# Patient Record
Sex: FEMALE | NOT HISPANIC OR LATINO | Employment: UNEMPLOYED | URBAN - METROPOLITAN AREA
[De-identification: names, ages, dates, MRNs, and addresses within clinical notes are randomized per-mention and may not be internally consistent; named-entity substitution may affect disease eponyms.]

---

## 2021-10-14 ENCOUNTER — OFFICE VISIT (OUTPATIENT)
Dept: OBSTETRICS AND GYNECOLOGY | Facility: CLINIC | Age: 36
End: 2021-10-14

## 2021-10-14 VITALS
HEIGHT: 62 IN | BODY MASS INDEX: 35.7 KG/M2 | SYSTOLIC BLOOD PRESSURE: 130 MMHG | WEIGHT: 194 LBS | DIASTOLIC BLOOD PRESSURE: 72 MMHG

## 2021-10-14 DIAGNOSIS — N83.9 DISORDER OF OVULATION: ICD-10-CM

## 2021-10-14 DIAGNOSIS — N92.6 MISSED MENSES: Primary | ICD-10-CM

## 2021-10-14 LAB
B-HCG UR QL: NEGATIVE
EXPIRATION DATE: NORMAL
INTERNAL NEGATIVE CONTROL: NORMAL
INTERNAL POSITIVE CONTROL: NORMAL
Lab: NORMAL
T4 FREE SERPL-MCNC: 1.04 NG/DL (ref 0.93–1.7)
TSH SERPL DL<=0.005 MIU/L-ACNC: 7.93 UIU/ML (ref 0.27–4.2)

## 2021-10-14 PROCEDURE — 99204 OFFICE O/P NEW MOD 45 MIN: CPT | Performed by: OBSTETRICS & GYNECOLOGY

## 2021-10-14 PROCEDURE — 81025 URINE PREGNANCY TEST: CPT | Performed by: OBSTETRICS & GYNECOLOGY

## 2021-10-14 RX ORDER — MEDROXYPROGESTERONE ACETATE 10 MG/1
10 TABLET ORAL DAILY
Qty: 10 TABLET | Refills: 2 | Status: SHIPPED | OUTPATIENT
Start: 2021-10-14 | End: 2021-10-24

## 2021-10-14 NOTE — PROGRESS NOTES
CC: Missed menses.  Patient is seen with her  today and they are from [place] originally. They have just moved here from [place] due to his job. She gives a history of having had normal menses every 26 days lasting for 3 days. She did have a 10-day delay in her menses in April of 2021 and then, most recently, she started her period on August 13th and is still not had a menses. She took a home pregnancy test that been negative and we did a pregnancy test here that was negative. They relate that they did see a provider in [place] due to the length of time that they've been trying to get pregnant. They describe some normal blood work and what sounds like a monitored cycle where an ultrasound showed good follicle development but she does not remember any other test and was never given any medication to help her ovulate. I discussed the delay in ovulation that is likely the diagnosis here. They do agree to a minimal work-up to get started here.  Assessment: 1.  Disorder of ovulation.  2.  Missed menses.  3.  Desired pregnancy  plan: TSH, prolactin, LH and FSH. Semen analysis information given to . Will reassess after all of these values. We'll send in a Provera to initiate a withdrawal bleed.  I spent 30 minutes caring for Coco on this date of service. This time includes time spent by me in the following activities: obtaining and/or reviewing a separately obtained history, counseling and educating the patient/family/caregiver, ordering medications, tests, or procedures and documenting information in the medical record

## 2021-10-15 LAB
FSH SERPL-ACNC: 7.2 MIU/ML
LH SERPL-ACNC: 7.2 MIU/ML
PROLACTIN SERPL-MCNC: 18.5 NG/ML (ref 4.8–23.3)

## 2021-10-26 ENCOUNTER — OFFICE VISIT (OUTPATIENT)
Dept: FAMILY MEDICINE CLINIC | Facility: CLINIC | Age: 36
End: 2021-10-26

## 2021-10-26 VITALS
SYSTOLIC BLOOD PRESSURE: 133 MMHG | OXYGEN SATURATION: 98 % | HEIGHT: 62 IN | WEIGHT: 196 LBS | BODY MASS INDEX: 36.07 KG/M2 | HEART RATE: 72 BPM | TEMPERATURE: 97.8 F | DIASTOLIC BLOOD PRESSURE: 84 MMHG

## 2021-10-26 DIAGNOSIS — L84 CORN OF FOOT: ICD-10-CM

## 2021-10-26 DIAGNOSIS — N91.2 AMENORRHEA: ICD-10-CM

## 2021-10-26 DIAGNOSIS — E03.4 HYPOTHYROIDISM DUE TO ACQUIRED ATROPHY OF THYROID: Primary | ICD-10-CM

## 2021-10-26 PROCEDURE — 99203 OFFICE O/P NEW LOW 30 MIN: CPT | Performed by: FAMILY MEDICINE

## 2021-10-26 RX ORDER — LEVOTHYROXINE SODIUM 0.03 MG/1
25 TABLET ORAL DAILY
Qty: 90 TABLET | Refills: 0 | Status: SHIPPED | OUTPATIENT
Start: 2021-10-26 | End: 2022-02-01 | Stop reason: SDUPTHER

## 2021-10-26 RX ORDER — MEDROXYPROGESTERONE ACETATE 10 MG/1
10 TABLET ORAL DAILY
COMMUNITY
End: 2022-09-20 | Stop reason: SDUPTHER

## 2021-11-08 ENCOUNTER — TELEPHONE (OUTPATIENT)
Dept: OBSTETRICS AND GYNECOLOGY | Facility: CLINIC | Age: 36
End: 2021-11-08

## 2021-11-08 NOTE — TELEPHONE ENCOUNTER
Called pt about n/s on 11/4/21, pt states she no longer needed the appointment because she started her cycle.darline

## 2021-12-13 ENCOUNTER — OFFICE VISIT (OUTPATIENT)
Dept: FAMILY MEDICINE CLINIC | Facility: CLINIC | Age: 36
End: 2021-12-13

## 2021-12-13 VITALS
TEMPERATURE: 97.5 F | WEIGHT: 194.5 LBS | SYSTOLIC BLOOD PRESSURE: 116 MMHG | OXYGEN SATURATION: 98 % | DIASTOLIC BLOOD PRESSURE: 80 MMHG | BODY MASS INDEX: 35.79 KG/M2 | HEIGHT: 62 IN | HEART RATE: 103 BPM

## 2021-12-13 DIAGNOSIS — E03.4 HYPOTHYROIDISM DUE TO ACQUIRED ATROPHY OF THYROID: ICD-10-CM

## 2021-12-13 DIAGNOSIS — Z00.00 ROUTINE PHYSICAL EXAMINATION: Primary | ICD-10-CM

## 2021-12-13 PROCEDURE — 99395 PREV VISIT EST AGE 18-39: CPT | Performed by: FAMILY MEDICINE

## 2021-12-13 NOTE — PROGRESS NOTES
"Chief Complaint  Hypothyroidism (F/u for TSH) and Annual Exam    Subjective          Coco Sánchez presents to [facility]  History of Present Illness for follow-up on annual exam and hypothyroidism.  pt with h/o recent diagnosis of  hypothyrodism complaining of weight gain, decreased energy.  She is taking 25 mcg of levothyroxine.  Patient diagnosed with hypothyroidism 2 months ago.  Denies any diarrhea but giving history of palpitation and anxiety.    Objective   Vital Signs:   /80   Pulse 103 Comment: Nervous  Temp 97.5 °F (36.4 °C) (Infrared)   Ht 157.5 cm (62\")   Wt 88.2 kg (194 lb 8 oz)   SpO2 98%   BMI 35.57 kg/m²     Physical Exam   Result Review :                 Assessment and Plan    Diagnoses and all orders for this visit:    1. Routine physical examination (Primary)  -     TSH+Free T4  -     Lipid Panel  -     Comprehensive Metabolic Panel  -     CBC & Differential  -     Hepatitis C Antibody    2. Hypothyroidism due to acquired atrophy of thyroid    Coco Sánchez, is a 36-year-old female patient seen today for  Routine physical, preventive screening discussed with patient.  She will schedule appointment with OB for Pap smear and pelvic exam.  Information on female on the OB group given to patient.  Daily activity also recommended patient.  I advised her intermittent fasting or low calorie diet.  She is not sure about her last Tdap we will check her record from [place].  We will do labs today.    Hypothyroidism, we will check TSH and T4 again today and if this is still low I will increase to 50 MCG.  ,    Follow Up   No follow-ups on file.  Patient was given instructions and counseling regarding her condition or for health maintenance advice. Please see specific information pulled into the AVS if appropriate.       "

## 2021-12-16 LAB
ALBUMIN SERPL-MCNC: 4.5 G/DL (ref 3.8–4.8)
ALBUMIN/GLOB SERPL: 1.8 {RATIO} (ref 1.2–2.2)
ALP SERPL-CCNC: 69 IU/L (ref 44–121)
ALT SERPL-CCNC: 18 IU/L (ref 0–32)
AST SERPL-CCNC: 19 IU/L (ref 0–40)
BASOPHILS # BLD AUTO: 0 X10E3/UL (ref 0–0.2)
BASOPHILS NFR BLD AUTO: 0 %
BILIRUB SERPL-MCNC: 0.2 MG/DL (ref 0–1.2)
BUN SERPL-MCNC: 9 MG/DL (ref 6–20)
BUN/CREAT SERPL: 13 (ref 9–23)
CALCIUM SERPL-MCNC: 10 MG/DL (ref 8.7–10.2)
CHLORIDE SERPL-SCNC: 101 MMOL/L (ref 96–106)
CHOLEST SERPL-MCNC: 191 MG/DL (ref 100–199)
CO2 SERPL-SCNC: 22 MMOL/L (ref 20–29)
CREAT SERPL-MCNC: 0.67 MG/DL (ref 0.57–1)
EOSINOPHIL # BLD AUTO: 0.2 X10E3/UL (ref 0–0.4)
EOSINOPHIL NFR BLD AUTO: 2 %
ERYTHROCYTE [DISTWIDTH] IN BLOOD BY AUTOMATED COUNT: 13.5 % (ref 11.7–15.4)
GLOBULIN SER CALC-MCNC: 2.5 G/DL (ref 1.5–4.5)
GLUCOSE SERPL-MCNC: 84 MG/DL (ref 65–99)
HCT VFR BLD AUTO: 41.3 % (ref 34–46.6)
HCV AB S/CO SERPL IA: <0.1 S/CO RATIO (ref 0–0.9)
HDLC SERPL-MCNC: 48 MG/DL
HGB BLD-MCNC: 13.2 G/DL (ref 11.1–15.9)
IMM GRANULOCYTES # BLD AUTO: 0 X10E3/UL (ref 0–0.1)
IMM GRANULOCYTES NFR BLD AUTO: 0 %
LDLC SERPL CALC-MCNC: 125 MG/DL (ref 0–99)
LYMPHOCYTES # BLD AUTO: 2.3 X10E3/UL (ref 0.7–3.1)
LYMPHOCYTES NFR BLD AUTO: 31 %
MCH RBC QN AUTO: 25 PG (ref 26.6–33)
MCHC RBC AUTO-ENTMCNC: 32 G/DL (ref 31.5–35.7)
MCV RBC AUTO: 78 FL (ref 79–97)
MONOCYTES # BLD AUTO: 0.4 X10E3/UL (ref 0.1–0.9)
MONOCYTES NFR BLD AUTO: 5 %
NEUTROPHILS # BLD AUTO: 4.6 X10E3/UL (ref 1.4–7)
NEUTROPHILS NFR BLD AUTO: 62 %
PLATELET # BLD AUTO: 390 X10E3/UL (ref 150–450)
POTASSIUM SERPL-SCNC: 4.4 MMOL/L (ref 3.5–5.2)
PROT SERPL-MCNC: 7 G/DL (ref 6–8.5)
RBC # BLD AUTO: 5.29 X10E6/UL (ref 3.77–5.28)
SODIUM SERPL-SCNC: 139 MMOL/L (ref 134–144)
T4 FREE SERPL-MCNC: 1.23 NG/DL (ref 0.82–1.77)
TRIGL SERPL-MCNC: 99 MG/DL (ref 0–149)
TSH SERPL DL<=0.005 MIU/L-ACNC: 3.71 UIU/ML (ref 0.45–4.5)
VLDLC SERPL CALC-MCNC: 18 MG/DL (ref 5–40)
WBC # BLD AUTO: 7.6 X10E3/UL (ref 3.4–10.8)

## 2021-12-20 ENCOUNTER — TELEPHONE (OUTPATIENT)
Dept: FAMILY MEDICINE CLINIC | Facility: CLINIC | Age: 36
End: 2021-12-20

## 2021-12-20 NOTE — TELEPHONE ENCOUNTER
Left voicemail for patient regarding these results/recommendations. OK per HIPAA Open telephone encounter left for reread by hub.    Hub please inform patient if call back:    Jamar!    Dr. Sparks has received and reviewed your lab results. She says the following:      Thyroid at goal continue same.  Also start taking prenatal vitamins every day           If you have any questions, feel free to reach back out to us!    Have a Good Day!  PEPE Diallo

## 2022-01-04 ENCOUNTER — TELEPHONE (OUTPATIENT)
Dept: OBSTETRICS AND GYNECOLOGY | Facility: CLINIC | Age: 37
End: 2022-01-04

## 2022-02-01 RX ORDER — LEVOTHYROXINE SODIUM 0.03 MG/1
25 TABLET ORAL DAILY
Qty: 90 TABLET | Refills: 1 | Status: SHIPPED | OUTPATIENT
Start: 2022-02-01 | End: 2022-05-10 | Stop reason: SDUPTHER

## 2022-02-01 NOTE — TELEPHONE ENCOUNTER
Rx Refill Note  Requested Prescriptions     Pending Prescriptions Disp Refills   • levothyroxine (Synthroid) 25 MCG tablet 90 tablet 0     Sig: Take 1 tablet by mouth Daily.      Last office visit with prescribing clinician: 12/13/2021      Next office visit with prescribing clinician: 6/13/2022            Imani Pittman MA  02/01/22, 12:50 EST

## 2022-03-03 ENCOUNTER — OFFICE VISIT (OUTPATIENT)
Dept: OBSTETRICS AND GYNECOLOGY | Facility: CLINIC | Age: 37
End: 2022-03-03

## 2022-03-03 VITALS
DIASTOLIC BLOOD PRESSURE: 72 MMHG | SYSTOLIC BLOOD PRESSURE: 122 MMHG | BODY MASS INDEX: 36.03 KG/M2 | HEIGHT: 62 IN | WEIGHT: 195.8 LBS

## 2022-03-03 DIAGNOSIS — N93.9 ABNORMAL UTERINE BLEEDING (AUB): ICD-10-CM

## 2022-03-03 DIAGNOSIS — Z01.419 WELL WOMAN EXAM WITH ROUTINE GYNECOLOGICAL EXAM: Primary | ICD-10-CM

## 2022-03-03 PROCEDURE — 99395 PREV VISIT EST AGE 18-39: CPT | Performed by: OBSTETRICS & GYNECOLOGY

## 2022-03-03 NOTE — PROGRESS NOTES
Chief Complaint   Patient presents with   • Gynecologic Exam     Patient here to discuss contraception and periods getting closer together        Coco Sánchez is a 36 y.o.  who presents for an annual examination   Here with her , prefers to use him as an interpretor  She has an 9yo. Tried for 2 years to conceive after that and was not successful.   She was seen in [place] and had an ultrasound which she reports was normal.    She was seen in the Fall and had an elevated TSH.  She has been on Synthroid and last values in Dec were normal.  They tried for four months with their 7 yo.  Did not use any ART  Her  has not yet had a semen analysis.    Prior to treatment of the hypothyroidism she was having some spacing of her menstrual cycles which have subsequently improved.     Pap history:  Last pap: Unsure  Prior abnormal paps: no  STDs  Sexually active: yes  History of STDs: no  Contraception:  none    Screening for BRCA-   Is patient's family history significant for BRCA risk factors? no    Past Medical History:   Diagnosis Date   • Hypothyroid      Past Surgical History:   Procedure Laterality Date   •  SECTION       OB History    Para Term  AB Living   1 1 1     1   SAB IAB Ectopic Molar Multiple Live Births             1      # Outcome Date GA Lbr Yanick/2nd Weight Sex Delivery Anes PTL Lv   1 Term 13   3750 g (8 lb 4.3 oz) M CS-Unspec   YONY      Complications: Failure to Progress in Second Stage      Social History     Tobacco Use   • Smoking status: Never Smoker   • Smokeless tobacco: Never Used   Vaping Use   • Vaping Use: Never used   Substance Use Topics   • Alcohol use: Never   • Drug use: Never     Family History   Problem Relation Age of Onset   • Diabetes Father    • Breast cancer Neg Hx    • Ovarian cancer Neg Hx    • Uterine cancer Neg Hx    • Colon cancer Neg Hx      Current Outpatient Medications on File Prior to Visit   Medication Sig Dispense Refill  "  • levothyroxine (Synthroid) 25 MCG tablet Take 1 tablet by mouth Daily. 90 tablet 1   • Prenatal Vit-Fe Fumarate-FA (PRENATAL VITAMINS PO) Take  by mouth.     • medroxyPROGESTERone (PROVERA) 10 MG tablet Take 10 mg by mouth Daily.     • salicylic acid-lactic acid 17 % external solution Apply  topically to the appropriate area as directed Daily. 14 mL 1     No current facility-administered medications on file prior to visit.     No Known Allergies     Review of Systems  See HPI    OBJECTIVE:   Vitals:    03/03/22 1430   BP: 122/72   Weight: 88.8 kg (195 lb 12.8 oz)   Height: 157.5 cm (62\")      Physical Exam  Exam conducted with a chaperone present.   Constitutional:       General: She is not in acute distress.     Appearance: She is well-developed. She is not diaphoretic.   HENT:      Head: Normocephalic and atraumatic.   Neck:      Thyroid: No thyromegaly.      Trachea: No tracheal deviation.   Cardiovascular:      Rate and Rhythm: Normal rate.      Heart sounds: Normal heart sounds. No murmur heard.  No friction rub. No gallop.    Pulmonary:      Effort: Pulmonary effort is normal. No respiratory distress.      Breath sounds: Normal breath sounds.   Chest:      Chest wall: No tenderness.   Breasts:      Right: No inverted nipple, mass, nipple discharge, skin change or tenderness.      Left: No inverted nipple, mass, nipple discharge, skin change or tenderness.       Abdominal:      General: There is no distension.      Palpations: Abdomen is soft. There is no mass.      Tenderness: There is no abdominal tenderness.   Genitourinary:     General: Normal vulva.      Labia:         Right: No rash, lesion or injury.         Left: No rash, lesion or injury.       Vagina: No vaginal discharge, tenderness or bleeding.      Cervix: No cervical motion tenderness, discharge or friability.      Uterus: Not deviated, not enlarged, not fixed and not tender.       Adnexa:         Right: No mass, tenderness or fullness.         "  Left: No mass, tenderness or fullness.     Musculoskeletal:         General: No deformity. Normal range of motion.   Lymphadenopathy:      Cervical: No cervical adenopathy.   Skin:     General: Skin is warm and dry.      Findings: No rash.   Neurological:      Mental Status: She is alert and oriented to person, place, and time.   Psychiatric:         Behavior: Behavior normal.         Thought Content: Thought content normal.         Judgment: Judgment normal.         ASSESSMENT/PLAN:     Annual well woman exam:  Cervical cancer screening:    Denies cervical dysplasia in past   The patient is due for a pap today.    Screening guidelines discussed with patient  Breast cancer screening:    Clinical breast exam recommended for age 20-39 years every 1-3 years   Mammogram recommended starting age 40    Breast self awareness encouraged  STD Screening   Testing agrees to serum labs.    Contraception :   Declined, on PNV   Reviewed limitations of our office in treating and work up of infertility. She would like to continue with labs. Will get AMH, testosterone, A1C as well as Rub and Varicella IgG.    She was provided information for a semen analysis. Once this information is back, we would recommend discussion of possible OI vs. Referral to [facility].  She reports a normal ultrasound in [place].    Family history    does not demonstrate need for genetics referral   Healthy lifestyle counseling:   return for routine annual checkups    BMI Counseling  Body mass index is 35.81 kg/m².       Return in about 1 month (around 4/3/2022) for GYN US, GYN visit.

## 2022-03-03 NOTE — PATIENT INSTRUCTIONS
[facility]   [place], [place]   [phone]    [facility]  [place], [place]   [phone]

## 2022-03-11 LAB
CYTOLOGIST CVX/VAG CYTO: NORMAL
CYTOLOGY CVX/VAG DOC CYTO: NORMAL
CYTOLOGY CVX/VAG DOC THIN PREP: NORMAL
DX ICD CODE: NORMAL
HIV 1 & 2 AB SER-IMP: NORMAL
HPV I/H RISK 4 DNA CVX QL PROBE+SIG AMP: NEGATIVE
Lab: NORMAL
OTHER STN SPEC: NORMAL
STAT OF ADQ CVX/VAG CYTO-IMP: NORMAL

## 2022-03-21 ENCOUNTER — TELEPHONE (OUTPATIENT)
Dept: OBSTETRICS AND GYNECOLOGY | Facility: CLINIC | Age: 37
End: 2022-03-21

## 2022-05-02 ENCOUNTER — TELEPHONE (OUTPATIENT)
Dept: OBSTETRICS AND GYNECOLOGY | Facility: CLINIC | Age: 37
End: 2022-05-02

## 2022-05-10 RX ORDER — LEVOTHYROXINE SODIUM 0.03 MG/1
25 TABLET ORAL DAILY
Qty: 90 TABLET | Refills: 0 | Status: SHIPPED | OUTPATIENT
Start: 2022-05-10

## 2022-05-10 NOTE — TELEPHONE ENCOUNTER
Rx Refill Note  Requested Prescriptions     Pending Prescriptions Disp Refills   • levothyroxine (Synthroid) 25 MCG tablet 90 tablet 0     Sig: Take 1 tablet by mouth Daily.      Last office visit with prescribing clinician: 12/13/2021      Next office visit with prescribing clinician: 8/1/2022            Imani Pittman MA  05/10/22, 10:20 EDT

## 2022-05-23 DIAGNOSIS — E55.9 VITAMIN D DEFICIENCY: ICD-10-CM

## 2022-05-23 DIAGNOSIS — E03.4 HYPOTHYROIDISM DUE TO ACQUIRED ATROPHY OF THYROID: Primary | ICD-10-CM

## 2022-05-23 DIAGNOSIS — E78.5 HYPERLIPIDEMIA, UNSPECIFIED HYPERLIPIDEMIA TYPE: ICD-10-CM

## 2022-05-23 DIAGNOSIS — R53.83 FATIGUE, UNSPECIFIED TYPE: ICD-10-CM

## 2022-08-24 ENCOUNTER — TELEPHONE (OUTPATIENT)
Dept: FAMILY MEDICINE CLINIC | Facility: CLINIC | Age: 37
End: 2022-08-24

## 2022-08-24 NOTE — TELEPHONE ENCOUNTER
Caller: Coco Sánchez    Relationship to patient: Self    Best call back number: [phone]  Date of exposure: ?    Date of positive COVID19 test: 8/23/22    Date if possible COVID19 exposure: ?    COVID19 symptoms: COUGH AND SORE THROAT    Date of initial quarantine: 8/23/22    Additional information or concerns: PLEASE CALL AND ADVISE PATIENT AS HOW TO TREAT HER SYMPTOMS.    What is the patients preferred pharmacy: [id number] - [place], [place] - [address] - [phone]  - [phone] FX

## 2022-08-25 ENCOUNTER — OFFICE VISIT (OUTPATIENT)
Dept: FAMILY MEDICINE CLINIC | Facility: CLINIC | Age: 37
End: 2022-08-25

## 2022-08-25 VITALS
WEIGHT: 195 LBS | DIASTOLIC BLOOD PRESSURE: 95 MMHG | OXYGEN SATURATION: 99 % | BODY MASS INDEX: 35.88 KG/M2 | TEMPERATURE: 98.3 F | SYSTOLIC BLOOD PRESSURE: 139 MMHG | HEART RATE: 109 BPM | HEIGHT: 62 IN

## 2022-08-25 DIAGNOSIS — U07.1 COVID-19 VIRUS INFECTION: Primary | ICD-10-CM

## 2022-08-25 PROCEDURE — 99213 OFFICE O/P EST LOW 20 MIN: CPT | Performed by: FAMILY MEDICINE

## 2022-08-25 RX ORDER — FLUTICASONE PROPIONATE 50 MCG
2 SPRAY, SUSPENSION (ML) NASAL DAILY
Qty: 16 G | Refills: 0 | Status: SHIPPED | OUTPATIENT
Start: 2022-08-25

## 2022-08-25 RX ORDER — PSEUDOEPHEDRINE HYDROCHLORIDE 60 MG/1
60 TABLET, FILM COATED ORAL 3 TIMES DAILY PRN
Qty: 30 TABLET | Refills: 0 | Status: SHIPPED | OUTPATIENT
Start: 2022-08-25

## 2022-08-25 NOTE — PROGRESS NOTES
"Chief Complaint  COVID-19 (TESTED POSITIVE ON Monday, CURRENTLY TAKING NYQUIL AND DAYQUIL, HAS SLIGHT COUGH, CONGESTION)    Lori Sánchez presents to [facility]  History of Present Illness    The patient presents in the clinic today with COVID-19 infection.    The patient tested positive for COVID-19 on 08/23/2022. 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 She had a slight cough and a sore throat. She was taking DayQuil NyQuil for relief of symptoms. She notes that her cough has improved. She has received 2 doses of the COVID-19 vaccines but has not received the booster.    Objective   Vital Signs:  /95   Pulse 109   Temp 98.3 °F (36.8 °C)   Ht 157.5 cm (62\")   Wt 88.5 kg (195 lb)   SpO2 99%   BMI 35.67 kg/m²   Estimated body mass index is 35.67 kg/m² as calculated from the following:    Height as of this encounter: 157.5 cm (62\").    Weight as of this encounter: 88.5 kg (195 lb).          Physical Exam  Vitals and nursing note reviewed.   Constitutional:       General: She is not in acute distress.     Appearance: She is well-developed.   HENT:      Head: Normocephalic and atraumatic.      Right Ear: External ear normal. Drainage present.      Left Ear: External ear normal. Drainage present.   Eyes:      General:         Right eye: No discharge.         Left eye: No discharge.      Pupils: Pupils are equal, round, and reactive to light.   Cardiovascular:      Rate and Rhythm: Normal rate and regular rhythm.      Heart sounds: Normal heart sounds.   Pulmonary:      Effort: Pulmonary effort is normal.      Breath sounds: Normal breath sounds. No wheezing or rales.   Abdominal:      General: Bowel sounds are normal.      Palpations: Abdomen is soft. There is no mass.      Tenderness: There is no abdominal tenderness.   Musculoskeletal:    "   Cervical back: Normal range of motion and neck supple.   Lymphadenopathy:      Cervical: No cervical adenopathy.   Neurological:      Mental Status: She is alert and oriented to person, place, and time.          Result Review :                    Assessment and Plan   Diagnoses and all orders for this visit:    1. COVID-19 virus infection (Primary)    Other orders  -     pseudoephedrine (SUDAFED) 60 MG tablet; Take 1 tablet by mouth 3 (Three) Times a Day As Needed for Congestion (ear fullness).  Dispense: 30 tablet; Refill: 0  -     fluticasone (Flonase) 50 MCG/ACT nasal spray; 2 sprays into the nostril(s) as directed by provider Daily.  Dispense: 16 g; Refill: 0         Haneefa Bee Gonzalo presents in clinic today for URI secondary to COVID-19 infection. Symptomatic, I discussed the available medication and symptomatic treatment. We discussed Paxil because of her BMI she will qualify. Patient reported that her symptoms are mild and she is getting better. I advised her to continue taking NyQuil, I will also send a prescription for Flonase nasal spray. She was advised to start taking vitamin D and zinc.         Follow Up   No follow-ups on file.    Patient was given instructions and counseling regarding her condition or for health maintenance advice. Please see specific information pulled into the AVS if appropriate.       Transcribed from ambient dictation for Opal Sparks MD by Rupa Mayes.  08/25/22   13:18 EDT    Patient verbalized consent to the visit recording.

## 2022-09-16 ENCOUNTER — OFFICE VISIT (OUTPATIENT)
Dept: FAMILY MEDICINE CLINIC | Facility: CLINIC | Age: 37
End: 2022-09-16

## 2022-09-16 VITALS
SYSTOLIC BLOOD PRESSURE: 116 MMHG | DIASTOLIC BLOOD PRESSURE: 62 MMHG | TEMPERATURE: 97.7 F | WEIGHT: 192.7 LBS | HEIGHT: 63 IN | HEART RATE: 89 BPM | OXYGEN SATURATION: 100 % | BODY MASS INDEX: 34.14 KG/M2

## 2022-09-16 DIAGNOSIS — E03.4 HYPOTHYROIDISM DUE TO ACQUIRED ATROPHY OF THYROID: ICD-10-CM

## 2022-09-16 DIAGNOSIS — Z13.1 SCREENING FOR DIABETES MELLITUS: ICD-10-CM

## 2022-09-16 DIAGNOSIS — M54.9 UPPER BACK PAIN: ICD-10-CM

## 2022-09-16 DIAGNOSIS — N91.2 AMENORRHEA: Primary | ICD-10-CM

## 2022-09-16 DIAGNOSIS — R53.83 FATIGUE, UNSPECIFIED TYPE: ICD-10-CM

## 2022-09-16 LAB
B-HCG UR QL: NEGATIVE
EXPIRATION DATE: NORMAL
INTERNAL NEGATIVE CONTROL: NORMAL
INTERNAL POSITIVE CONTROL: NORMAL
Lab: NORMAL

## 2022-09-16 PROCEDURE — 99214 OFFICE O/P EST MOD 30 MIN: CPT | Performed by: FAMILY MEDICINE

## 2022-09-16 PROCEDURE — 81025 URINE PREGNANCY TEST: CPT | Performed by: FAMILY MEDICINE

## 2022-09-16 RX ORDER — LEVOTHYROXINE SODIUM 0.1 MG/1
100 TABLET ORAL DAILY
COMMUNITY
End: 2022-09-22

## 2022-09-16 NOTE — PROGRESS NOTES
"Chief Complaint  Hypothyroidism (F/u hypothyroidism, Pt c/o of back pain x3wks. Worse with sitting for extended periods of time. Pain rate at 6. Patient also states she missed menstrual period was to be due 8/24.) and Back Pain    Subjective        Coco Sánchez presents to [facility] for follow-up on hypothyroidism and back pain.    History of Present Illness    The patient complains of not having her menstrual cycle for last 2 months.. Her last menstrual cycle was on 07/24/2022. She has not done a pregnancy test and is unsure if she may be pregnant. Denies any nausea or vomiting.  History of irregular menstrual cycle in the past.    The patient is also complaining of an upper back pain for 3 weeks. She has to sit for a prolonged period of time at work.     The patient has done her lab tests in [place] back in 07/2022, but forgot to bring her results today. A CBC, lipid panel and thyroid test were done. She states that her lipid panel was normal but her thyroid tests were not.   pt with h/o hypothyrodism.denies change in energy level, diarrhea, heat / cold intolerance, nervousness, palpitations   She is currently taking levothyroxine, iron and zinc tablets.     Objective   Vital Signs:  /62   Pulse 89   Temp 97.7 °F (36.5 °C)   Ht 160 cm (63\")   Wt 87.4 kg (192 lb 11.2 oz)   SpO2 100%   BMI 34.14 kg/m²   Estimated body mass index is 34.14 kg/m² as calculated from the following:    Height as of this encounter: 160 cm (63\").    Weight as of this encounter: 87.4 kg (192 lb 11.2 oz).          Physical Exam  Constitutional:       Appearance: Normal appearance. She is well-developed.   HENT:      Head: Normocephalic and atraumatic.      Right Ear: Tympanic membrane, ear canal and external ear normal.      Left Ear: Tympanic membrane, ear canal and external ear normal.      Nose: Nose normal.      Mouth/Throat:      Mouth: Mucous membranes are moist.   Eyes:      General: No " scleral icterus.     Extraocular Movements: Extraocular movements intact.      Conjunctiva/sclera: Conjunctivae normal.      Pupils: Pupils are equal, round, and reactive to light.   Neck:      Thyroid: No thyromegaly.   Cardiovascular:      Rate and Rhythm: Normal rate and regular rhythm.      Pulses: Normal pulses.      Heart sounds: Normal heart sounds.   Pulmonary:      Effort: Pulmonary effort is normal. No respiratory distress.      Breath sounds: Normal breath sounds. No wheezing or rales.   Abdominal:      General: Bowel sounds are normal. There is no distension.      Palpations: Abdomen is soft. There is no mass.      Tenderness: There is no abdominal tenderness.      Hernia: No hernia is present.   Musculoskeletal:         General: No swelling or tenderness. Normal range of motion.      Cervical back: Normal range of motion and neck supple. Muscular tenderness present. No pain with movement. Normal range of motion.   Lymphadenopathy:      Cervical: No cervical adenopathy.   Skin:     General: Skin is warm.   Neurological:      General: No focal deficit present.      Mental Status: She is alert and oriented to person, place, and time.      Cranial Nerves: No cranial nerve deficit.      Sensory: No sensory deficit.      Deep Tendon Reflexes: Reflexes normal.   Psychiatric:         Mood and Affect: Mood normal.         Behavior: Behavior normal.         Thought Content: Thought content normal.         Judgment: Judgment normal.        Result Review :                  Assessment and Plan   Diagnoses and all orders for this visit:    1. Amenorrhea (Primary)  -     POC Pregnancy, Urine  -     Testosterone (Free & Total), LC / MS  -     hCG, Serum, Qualitative    2. Hypothyroidism due to acquired atrophy of thyroid  -     TSH+Free T4    3. Upper back pain    4. Screening for diabetes mellitus  -     Hemoglobin A1c    5. Fatigue, unspecified type  -     Vitamin B12 & Folate        1. Amenorrhea.  -Her last  menstrual cycle was in 07/24/2022. She has not had any menstrual cycle since then. She also had a history of amenorrhea in the past. Urine pregnancy test done in the office is negative. I will also check beta-HCG. If it is negative, we will prescribe her medroxyprogesterone for withdrawal bleeding. I advised her to lose some weight and start doing some exercise and daily walk.  2. Hypothyroidism.  -I will check her TSH and T4 levels. She is currently on levothyroxine 100mg.  We will call the patient with lab results and medication changes if needed  3. Upper back pain.  -The patient is also complaining of an upper back pain. She has to sit for a prolonged period of time at work. Back stretching exercises recommended to the patient. She can take ibuprofen as needed for the pain.     Follow-up as needed or in 6 months.            Follow Up   No follow-ups on file.    Patient was given instructions and counseling regarding her condition or for health maintenance advice. Please see specific information pulled into the AVS if appropriate.       .[email or web address]    Transcribed from ambient dictation for Opal Sparks MD by Satnam Campa.  09/16/22   12:53 EDT    Patient verbalized consent to the visit recording.

## 2022-09-20 LAB
B-HCG SERPL QL: NEGATIVE
FOLATE SERPL-MCNC: >20 NG/ML (ref 4.78–24.2)
HBA1C MFR BLD: 5.7 % (ref 4.8–5.6)
T4 FREE SERPL-MCNC: 1.26 NG/DL (ref 0.93–1.7)
TESTOST FREE SERPL-MCNC: 0.8 PG/ML (ref 0–4.2)
TESTOST SERPL-MCNC: 23.3 NG/DL (ref 10–55)
TSH SERPL DL<=0.005 MIU/L-ACNC: 2.34 UIU/ML (ref 0.27–4.2)
VIT B12 SERPL-MCNC: 594 PG/ML (ref 211–946)

## 2022-09-20 RX ORDER — MEDROXYPROGESTERONE ACETATE 10 MG/1
10 TABLET ORAL DAILY
Qty: 7 TABLET | Refills: 0 | Status: SHIPPED | OUTPATIENT
Start: 2022-09-20 | End: 2022-11-11 | Stop reason: SDUPTHER

## 2022-09-20 NOTE — TELEPHONE ENCOUNTER
Caller: Coco Sánchez Reema    Relationship: Self    Best call back number: [phone]    Requested Prescriptions:   Requested Prescriptions     Pending Prescriptions Disp Refills   • medroxyPROGESTERone (PROVERA) 10 MG tablet       Sig: Take 1 tablet by mouth Daily.        Pharmacy where request should be sent: [id number] [address]  AT [facility] [phone] [facility] [phone] FX     Additional details provided by patient: PREGNANCY TEST WAS NEGATIVE AND PATIENT IS REQUESTING REFILL    Does the patient have less than a 3 day supply:  [x] Yes  [] No    Westley Snell Rep   09/20/22 14:50 EDT

## 2022-09-20 NOTE — TELEPHONE ENCOUNTER
Rx Refill Note  Requested Prescriptions     Pending Prescriptions Disp Refills   • medroxyPROGESTERone (PROVERA) 10 MG tablet       Sig: Take 1 tablet by mouth Daily.      Last office visit with prescribing clinician: 9/16/2022      Next office visit with prescribing clinician: Visit date not found            Imani Pittman MA  09/20/22, 16:26 EDT

## 2022-09-22 RX ORDER — LEVOTHYROXINE SODIUM 0.1 MG/1
100 TABLET ORAL DAILY
Qty: 90 TABLET | Refills: 1 | Status: SHIPPED | OUTPATIENT
Start: 2022-09-22 | End: 2022-09-23 | Stop reason: SDUPTHER

## 2022-09-22 NOTE — TELEPHONE ENCOUNTER
Rx Refill Note  Requested Prescriptions     Pending Prescriptions Disp Refills   • levothyroxine (SYNTHROID, LEVOTHROID) 100 MCG tablet 90 tablet 1     Sig: Take 1 tablet by mouth Daily.      Last office visit with prescribing clinician: 9/16/2022      Next office visit with prescribing clinician: Visit date not found            Imani Pittman MA  09/22/22, 15:08 EDT

## 2022-09-23 RX ORDER — LEVOTHYROXINE SODIUM 0.1 MG/1
100 TABLET ORAL DAILY
Qty: 90 TABLET | Refills: 1 | Status: SHIPPED | OUTPATIENT
Start: 2022-09-23

## 2022-11-11 RX ORDER — MEDROXYPROGESTERONE ACETATE 10 MG/1
10 TABLET ORAL DAILY
Qty: 7 TABLET | Refills: 0 | Status: SHIPPED | OUTPATIENT
Start: 2022-11-11

## 2023-01-10 ENCOUNTER — TELEPHONE (OUTPATIENT)
Dept: OBSTETRICS AND GYNECOLOGY | Facility: CLINIC | Age: 38
End: 2023-01-10
Payer: COMMERCIAL

## 2023-09-19 ENCOUNTER — OFFICE VISIT (OUTPATIENT)
Dept: OBSTETRICS AND GYNECOLOGY | Facility: CLINIC | Age: 38
End: 2023-09-19
Payer: COMMERCIAL

## 2023-09-19 VITALS
HEIGHT: 63 IN | BODY MASS INDEX: 34.55 KG/M2 | WEIGHT: 195 LBS | SYSTOLIC BLOOD PRESSURE: 144 MMHG | DIASTOLIC BLOOD PRESSURE: 91 MMHG

## 2023-09-19 DIAGNOSIS — R73.09 ELEVATED HEMOGLOBIN A1C: ICD-10-CM

## 2023-09-19 DIAGNOSIS — N93.9 ABNORMAL UTERINE BLEEDING (AUB): Primary | ICD-10-CM

## 2023-09-19 LAB
B-HCG UR QL: NEGATIVE
EXPIRATION DATE: NORMAL
INTERNAL NEGATIVE CONTROL: NEGATIVE
INTERNAL POSITIVE CONTROL: POSITIVE
Lab: NORMAL

## 2023-09-19 RX ORDER — MEDROXYPROGESTERONE ACETATE 10 MG/1
10 TABLET ORAL DAILY
Qty: 10 TABLET | Refills: 0 | Status: SHIPPED | OUTPATIENT
Start: 2023-09-19 | End: 2023-09-29

## 2023-09-19 NOTE — PROGRESS NOTES
"SUBJECTIVE:   Chief Complaint   Patient presents with    Menstrual Problem     Patient did not have a period in  or July she was in her home town and was unable to see someone. When she got back she started bleeding 5 days later on aug 10th and has been bleeding since. States the bleeding is constantly light, has not had any heavy bleeding episodes.         Coco Sánchez is a 38 y.o.  who presents for abnormal uterine bleeding.  She was in her home country, [place].  She had normal menstrual cycles up until  and July when she skipped her menses.  Back in  she reports taking Provera for induction of menstrual cycles when she missed a cycle.  Starting around August 10 when she returned to the [place], she started bleeding lightly has blood daily.  In the last couple days, this has gotten heavier but around the  this when she has her typical menses.  She denies any pain.  She is trying to conceive.    In [place], they checked her thyroid and lowered her to 50 mcg daily of levothyroxine.  She is also on a B complex vitamin and started on medication for cholesterol which she finished last week.  She reports this is \"Sevatin\"    She was offered and declined an interpretor    Past Medical History:   Diagnosis Date    Hypothyroid      Past Surgical History:   Procedure Laterality Date     SECTION       OB History    Para Term  AB Living   1 1 1     1   SAB IAB Ectopic Molar Multiple Live Births             1      # Outcome Date GA Lbr Yanick/2nd Weight Sex Delivery Anes PTL Lv   1 Term 13   3750 g (8 lb 4.3 oz) M CS-Unspec   YONY      Complications: Failure to Progress in Second Stage      Social History     Tobacco Use    Smoking status: Never    Smokeless tobacco: Never   Vaping Use    Vaping Use: Never used   Substance Use Topics    Alcohol use: Never    Drug use: Never     Family History   Problem Relation Age of Onset    Diabetes Father     Breast cancer Neg Hx     Ovarian " "cancer Neg Hx     Uterine cancer Neg Hx     Colon cancer Neg Hx      Current Outpatient Medications on File Prior to Visit   Medication Sig Dispense Refill    fluticasone (Flonase) 50 MCG/ACT nasal spray 2 sprays into the nostril(s) as directed by provider Daily. 16 g 0    levothyroxine (Synthroid) 25 MCG tablet Take 1 tablet by mouth Daily. 90 tablet 0    Multiple Vitamins-Minerals (b complex-C-E-zinc) tablet Take 1 tablet by mouth Daily.      salicylic acid-lactic acid 17 % external solution Apply  topically to the appropriate area as directed Daily. 14 mL 1    vitamin D3 125 MCG (5000 UT) capsule capsule Take 1 capsule by mouth Daily.      [DISCONTINUED] levothyroxine (SYNTHROID, LEVOTHROID) 100 MCG tablet Take 1 tablet by mouth Daily. 90 tablet 1    [DISCONTINUED] medroxyPROGESTERone (PROVERA) 10 MG tablet Take 1 tablet by mouth Daily. 7 tablet 0    [DISCONTINUED] Prenatal Vit-Fe Fumarate-FA (PRENATAL VITAMINS PO) Take  by mouth.      [DISCONTINUED] pseudoephedrine (SUDAFED) 60 MG tablet Take 1 tablet by mouth 3 (Three) Times a Day As Needed for Congestion (ear fullness). 30 tablet 0     No current facility-administered medications on file prior to visit.     No Known Allergies     Review of Systems      OBJECTIVE:   Vitals:    09/19/23 1248   BP: 144/91   Weight: 88.5 kg (195 lb)   Height: 160 cm (62.99\")      Physical Exam  Exam conducted with a chaperone present.   Constitutional:       General: She is not in acute distress.     Appearance: She is well-developed. She is not diaphoretic.   HENT:      Head: Normocephalic and atraumatic.   Eyes:      General: No scleral icterus.     Extraocular Movements: Extraocular movements intact.   Pulmonary:      Effort: Pulmonary effort is normal. No respiratory distress.   Genitourinary:     General: Normal vulva.      Vagina: No signs of injury and foreign body. Bleeding present. No vaginal discharge.      Cervix: Cervical bleeding present. No friability.      Uterus: " Normal.    Skin:     General: Skin is warm and dry.   Neurological:      General: No focal deficit present.      Mental Status: She is alert and oriented to person, place, and time.   Psychiatric:         Mood and Affect: Mood normal.         Behavior: Behavior normal.         Thought Content: Thought content normal.         Judgment: Judgment normal.       ASSESSMENT/PLAN:     ICD-10-CM ICD-9-CM   1. Abnormal uterine bleeding (AUB)  N93.9 626.9   2. Elevated hemoglobin A1c  R73.09 790.29       Recommended an ultrasound and labs.  She will start Provera as this is helped improve her bleeding in the past.  Reviewed bleeding precautions  Encouraged folic acid supplementation and avoidance of statins if TTC    Orders Placed This Encounter   Procedures    TSH Rfx On Abnormal To Free T4     Order Specific Question:   Release to patient     Answer:   Routine Release [[id number]]    CBC (No Diff)     Order Specific Question:   Release to patient     Answer:   Routine Release [[id number]]    Testosterone - total     Order Specific Question:   Release to patient     Answer:   Routine Release [[id number]]    Prolactin     Order Specific Question:   Release to patient     Answer:   Routine Release [[id number]]    Hemoglobin A1c     Order Specific Question:   Release to patient     Answer:   Routine Release [[id number]]    POC Pregnancy, Urine     Order Specific Question:   Release to patient     Answer:   Routine Release [[id number]]       Return in about 1 month (around 10/19/2023) for GYN US, GYN visit.

## 2023-09-20 ENCOUNTER — TELEPHONE (OUTPATIENT)
Dept: OBSTETRICS AND GYNECOLOGY | Facility: CLINIC | Age: 38
End: 2023-09-20
Payer: COMMERCIAL

## 2023-09-20 DIAGNOSIS — R79.89 ELEVATED PROLACTIN LEVEL: Primary | ICD-10-CM

## 2023-09-20 LAB
ERYTHROCYTE [DISTWIDTH] IN BLOOD BY AUTOMATED COUNT: 16 % (ref 11.7–15.4)
HBA1C MFR BLD: 5.7 % (ref 4.8–5.6)
HCT VFR BLD AUTO: 39.1 % (ref 34–46.6)
HGB BLD-MCNC: 12.2 G/DL (ref 11.1–15.9)
MCH RBC QN AUTO: 23.6 PG (ref 26.6–33)
MCHC RBC AUTO-ENTMCNC: 31.2 G/DL (ref 31.5–35.7)
MCV RBC AUTO: 76 FL (ref 79–97)
PLATELET # BLD AUTO: 431 X10E3/UL (ref 150–450)
PROLACTIN SERPL-MCNC: 29.7 NG/ML (ref 4.8–23.3)
RBC # BLD AUTO: 5.17 X10E6/UL (ref 3.77–5.28)
TESTOST SERPL-MCNC: 18 NG/DL (ref 8–60)
TSH SERPL DL<=0.005 MIU/L-ACNC: 2.06 UIU/ML (ref 0.45–4.5)
WBC # BLD AUTO: 8.7 X10E3/UL (ref 3.4–10.8)

## 2023-09-20 NOTE — TELEPHONE ENCOUNTER
Please let patient know that her thyroid labs were normal.  Her hemoglobin was also normal at 12.2.  Her prolactin was mildly elevated at 29.7.  We recommend repeating this in the morning while fasting as mild elevations are usually not clinically significant. I have placed an order.  Her A1C was elevated at 5.7; I would recommend reviewing this with her primary care provider as she will likely want to make lifestyle changes to help prevent progression into the diabetic range.

## 2023-09-20 NOTE — TELEPHONE ENCOUNTER
Pt aware. She did not want to come in tomorrow for repeat prolactin, pt scheduled for October 9th. If you would like pt to come in sooner for this, I can try and get her scheduled sooner.     Thanks!

## 2023-10-10 ENCOUNTER — TELEPHONE (OUTPATIENT)
Dept: OBSTETRICS AND GYNECOLOGY | Facility: CLINIC | Age: 38
End: 2023-10-10
Payer: COMMERCIAL

## 2023-10-10 NOTE — TELEPHONE ENCOUNTER
Called patient to discuss. No answer. Left general VM for call back.      Please let patient know that her prolactin was still slightly elevated if she returns call. We will discuss what to do about this at her upcoming appointment on 10/24

## 2023-10-24 ENCOUNTER — OFFICE VISIT (OUTPATIENT)
Dept: OBSTETRICS AND GYNECOLOGY | Facility: CLINIC | Age: 38
End: 2023-10-24
Payer: COMMERCIAL

## 2023-10-24 VITALS
WEIGHT: 194 LBS | BODY MASS INDEX: 34.38 KG/M2 | DIASTOLIC BLOOD PRESSURE: 83 MMHG | HEIGHT: 63 IN | SYSTOLIC BLOOD PRESSURE: 135 MMHG | HEART RATE: 84 BPM

## 2023-10-24 DIAGNOSIS — E22.1 HYPERPROLACTINEMIA: Primary | ICD-10-CM

## 2023-10-24 DIAGNOSIS — N83.209 CYST OF OVARY, UNSPECIFIED LATERALITY: ICD-10-CM

## 2023-10-24 DIAGNOSIS — N93.9 ABNORMAL UTERINE BLEEDING (AUB): ICD-10-CM

## 2023-10-24 NOTE — PROGRESS NOTES
SUBJECTIVE:   Chief Complaint   Patient presents with    Follow-up     F/u from us.          Coco Sánchez is a 38 y.o.  who presents for AUB  Patient reports she was bleeding up until about a week ago.  She did take the Provera but it took about a week after that to stop the bleeding.    She denies any headache or vision changes.  She is taking Synthroid 50 mcg daily, calcium, zinc.  Has not on any other medications.  Her goal is to conceive.      Past Medical History:   Diagnosis Date    Hypothyroid      Past Surgical History:   Procedure Laterality Date     SECTION       OB History    Para Term  AB Living   1 1 1     1   SAB IAB Ectopic Molar Multiple Live Births             1      # Outcome Date GA Lbr Yanick/2nd Weight Sex Delivery Anes PTL Lv   1 Term 13   3750 g (8 lb 4.3 oz) M CS-Unspec   YONY      Complications: Failure to Progress in Second Stage      Social History     Tobacco Use    Smoking status: Never    Smokeless tobacco: Never   Vaping Use    Vaping Use: Never used   Substance Use Topics    Alcohol use: Never    Drug use: Never     Family History   Problem Relation Age of Onset    Diabetes Father     Breast cancer Neg Hx     Ovarian cancer Neg Hx     Uterine cancer Neg Hx     Colon cancer Neg Hx      Current Outpatient Medications on File Prior to Visit   Medication Sig Dispense Refill    fluticasone (Flonase) 50 MCG/ACT nasal spray 2 sprays into the nostril(s) as directed by provider Daily. 16 g 0    levothyroxine (Synthroid) 25 MCG tablet Take 1 tablet by mouth Daily. 90 tablet 0    Multiple Vitamins-Minerals (b complex-C-E-zinc) tablet Take 1 tablet by mouth Daily.      salicylic acid-lactic acid 17 % external solution Apply  topically to the appropriate area as directed Daily. 14 mL 1    vitamin D3 125 MCG (5000 UT) capsule capsule Take 1 capsule by mouth Daily.       No current facility-administered medications on file prior to visit.     No Known Allergies  "    Review of Systems      OBJECTIVE:   Vitals:    10/24/23 1121   BP: 135/83   Pulse: 84   Weight: 88 kg (194 lb)   Height: 160 cm (62.99\")      Physical Exam  Constitutional:       General: She is not in acute distress.     Appearance: She is well-developed. She is not diaphoretic.   HENT:      Head: Normocephalic and atraumatic.   Eyes:      General: No scleral icterus.     Extraocular Movements: Extraocular movements intact.   Pulmonary:      Effort: Pulmonary effort is normal. No respiratory distress.   Skin:     General: Skin is warm and dry.   Neurological:      General: No focal deficit present.      Mental Status: She is alert and oriented to person, place, and time.   Psychiatric:         Mood and Affect: Mood normal.         Behavior: Behavior normal.         Thought Content: Thought content normal.         Judgment: Judgment normal.         ASSESSMENT/PLAN:     ICD-10-CM ICD-9-CM   1. Hyperprolactinemia  E22.1 253.1   2. Cyst of ovary, unspecified laterality  N83.209 620.2   3. Abnormal uterine bleeding (AUB)  N93.9 626.9       Reviewed ultrasound with patient and her .  This showed a follicle with a single septation that was approximately 4 cm. She is not having any pain. Recommend follow up in approximately 1 month and will get .    Hyperprolactinemia: Patient was counseled that recommendation is for MRI imaging even at low levels of elevation of prolactin.  She has no drug-induced causes of hyperprolactinemia.  She is asymptomatic.  I reviewed how prolactin can interfere with fertility, and ovulation.  We discussed that medication can help with this side effect.  She will consider her options with regard to the MRI and let us know how she would like to proceed.  I did place an order for an MRI today.  We discussed the risks of macroadenoma's including vision loss, headaches.  She has not of this at the current time.    Fertility: We will add AMH to labs today    Abnormal uterine bleeding: " Patient was counseled on ultrasound findings which shows an approximately 1 cm endometrial thickness.  We discussed limitations of ultrasound in finding polyps.  We discussed options for saline infusion sonogram, endometrial biopsy, hysteroscopy with dilation and curettage.  We discussed limitations of each of these tests.  I discussed that ultrasound alone cannot rule out hyperplasia or malignancy and I would recommend some sort of endometrial sampling.  She will consider if she would prefer an office sampling versus hysteroscopy, D&C in the operating room.        Orders Placed This Encounter   Procedures    MRI brain w wo contrast     Standing Status:   Future     Standing Expiration Date:   10/24/2024     Order Specific Question:   Release to patient     Answer:   Routine Release [[id number]]    US Non-ob Transvaginal     Standing Status:   Future     Standing Expiration Date:   10/24/2024     Order Specific Question:   Reason for Exam:     Answer:   AUB, follow up 4cm ovarian follicle with septation     Order Specific Question:   Release to patient     Answer:   Routine Release [[id number]]         Order Specific Question:   Release to patient     Answer:   Routine Release [[id number]]    Antimullerian Hormone (AMH)     Order Specific Question:   Release to patient     Answer:   Routine Release [[id number]]       Return in about 1 month (around 11/24/2023) for repeat gyn us and visit.

## 2023-10-25 LAB — CANCER AG125 SERPL-ACNC: 15.1 U/ML (ref 0–38.1)

## 2023-10-27 LAB — MIS SERPL-MCNC: 0.74 NG/ML

## 2023-10-30 ENCOUNTER — TELEPHONE (OUTPATIENT)
Dept: OBSTETRICS AND GYNECOLOGY | Facility: CLINIC | Age: 38
End: 2023-10-30
Payer: COMMERCIAL

## 2023-10-30 NOTE — TELEPHONE ENCOUNTER
Called patient to review AMH.  There was no answer; left general VM for call back.      If patient returns call, please let her know that her AMH was < 1.  In these cases we recommend discussion with a reproductive endocrinologist if she wants to have another baby as the AMH can continue to go down and so we recommend starting fertility treatments as soon as possible.   Below are two of the [facility] offices that are in the [place] area, but there are some more that come here and there are some in surrounding area.    [facility]   [place], [place]   [phone]    [facility]  [place], [place]   [phone]

## 2023-11-02 ENCOUNTER — TELEPHONE (OUTPATIENT)
Dept: OBSTETRICS AND GYNECOLOGY | Facility: CLINIC | Age: 38
End: 2023-11-02

## 2023-11-06 NOTE — TELEPHONE ENCOUNTER
Called patient, no answer. Appt on 11/21 if she doesn't not call back before then I will inform her at this visit.

## 2023-12-19 ENCOUNTER — PATIENT MESSAGE (OUTPATIENT)
Dept: FAMILY MEDICINE CLINIC | Facility: CLINIC | Age: 38
End: 2023-12-19
Payer: COMMERCIAL

## 2023-12-20 NOTE — TELEPHONE ENCOUNTER
Rx Refill Note  Requested Prescriptions     Pending Prescriptions Disp Refills    levothyroxine (Synthroid) 25 MCG tablet 90 tablet 0     Sig: Take 1 tablet by mouth Daily.      Last office visit with prescribing clinician: 9/16/2022   Last telemedicine visit with prescribing clinician: Visit date not found   Next office visit with prescribing clinician: Visit date not found                         Would you like a call back once the refill request has been completed: [] Yes [] No    If the office needs to give you a call back, can they leave a voicemail: [] Yes [] No    Beverly Campbell LPN  12/20/23, 09:11 EST

## 2023-12-20 NOTE — TELEPHONE ENCOUNTER
From: Coco Sánchez  To: Opal Sparks  Sent: 12/19/2023 10:35 PM EST  Subject: Thyroid medicine(50mcg)    Hello Doctor,  The tablets of thyroid may be completed this week.I am taking 50 mcg.In the month of October blood tests were done for thyroid(ts4) and my Gynecologist told me to continue the same tablets.Can you send me the prescription ??    Thanks,  Coco.

## 2023-12-22 RX ORDER — LEVOTHYROXINE SODIUM 0.03 MG/1
25 TABLET ORAL DAILY
Qty: 90 TABLET | Refills: 0 | Status: SHIPPED | OUTPATIENT
Start: 2023-12-22

## 2024-01-29 ENCOUNTER — OFFICE VISIT (OUTPATIENT)
Dept: OBSTETRICS AND GYNECOLOGY | Facility: CLINIC | Age: 39
End: 2024-01-29
Payer: COMMERCIAL

## 2024-01-29 VITALS
SYSTOLIC BLOOD PRESSURE: 125 MMHG | WEIGHT: 198 LBS | HEIGHT: 63 IN | BODY MASS INDEX: 35.08 KG/M2 | DIASTOLIC BLOOD PRESSURE: 82 MMHG

## 2024-01-29 DIAGNOSIS — N83.209 CYST OF OVARY, UNSPECIFIED LATERALITY: Primary | ICD-10-CM

## 2024-01-29 DIAGNOSIS — N93.9 ABNORMAL UTERINE BLEEDING (AUB): ICD-10-CM

## 2024-01-29 PROCEDURE — 99214 OFFICE O/P EST MOD 30 MIN: CPT

## 2024-01-29 RX ORDER — FLUOROMETHOLONE 0.1 %
SUSPENSION, DROPS(FINAL DOSAGE FORM)(ML) OPHTHALMIC (EYE)
COMMUNITY
Start: 2023-11-16

## 2024-01-29 RX ORDER — PRENATAL VIT NO.126/IRON/FOLIC 28MG-0.8MG
TABLET ORAL DAILY
COMMUNITY

## 2024-01-29 NOTE — PROGRESS NOTES
"GYN VISIT    Chief Complaint   Patient presents with    Gynecologic Exam     Patient is here for 1 month f/u ([facility])        SUBJECTIVE:     Coco Benavidez is a 38 y.o.  who presents today for an ultrasound and office visit for abnormal uterine bleeding. She has seen Dr. Rivera in the past for this problem. At her last appoinbtment a thickened endometrium was seen on ultrasound, warranting a follow up ultrasound.  An AMH of less than 1 was also found on her bloodwork at her last visit. Her goal is to conceive and she was given a referral to a fertility specialist.  She did not get the MRI of her brain done because she did not want it.     Past Medical History:   Diagnosis Date    Hypothyroid         Past Surgical History:   Procedure Laterality Date     SECTION          Review of Systems   All other systems reviewed and are negative.      OBJECTIVE:     Vitals:    24 0904   BP: 125/82   Weight: 89.8 kg (198 lb)   Height: 160 cm (62.99\")        Physical Exam  Constitutional:       General: She is awake.      Appearance: Normal appearance. She is well-developed and well-groomed.   HENT:      Head: Normocephalic and atraumatic.   Pulmonary:      Effort: Pulmonary effort is normal.   Musculoskeletal:      Cervical back: Normal range of motion.   Neurological:      General: No focal deficit present.      Mental Status: She is alert and oriented to person, place, and time.   Skin:     General: Skin is warm and dry.   Psychiatric:         Mood and Affect: Mood normal.         Behavior: Behavior normal. Behavior is cooperative.   Vitals reviewed.         ASSESSMENT/PLAN:    Diagnoses and all orders for this visit:    1. Cyst of ovary, unspecified laterality (Primary)    2. Abnormal uterine bleeding (AUB)  -     US Non-ob Transvaginal; Future  -     Prolactin; Future        Discussed endometrial lining thickness today and reiterated Dr. Rivera's previous recommendation to biopsy " endometrial tissue. Patient requested that I explain her options again as far as that goes.   The patient is not ready today to make a decision about either the endometrial biopsy or the hysteroscopy at today's appointment.   Reviewed use of ovulation prediction kits and how and when to use them.   Return to care per patient request for additional ultrasound and repeat prolactin level - discussed risks of waiting, patient verbalizes understanding and wants to wait.     Return in about 1 month (around 2024) for ultrasound + office visit to discuss hysteroscopy vs endometrial biopsy in office.    I spent 15 minutes caring for Coco Benavidez on this date of service. This time includes time spent by me in the following activities: preparing for the visit, reviewing tests, obtaining and/or reviewing a separately obtained history, performing a medically appropriate examination and/or evaluation, counseling and educating the patient/family/caregiver, ordering medications, tests, or procedures, referring and communicating with other health care professionals, documenting information in the medical record, independently interpreting results and communicating that information with the patient/family/caregiver, and care coordination    Geetha Choi CNM  2024  10:28 EST      TRANSVAGINAL ULTRASOUND PRELIMINARY RESULT   2024  DIAGNOSIS: abnormal uterine bleeding  UTERUS:  length 11.49 cm, volume: 193.689 cm3  ENDOMETRIAL LININ.31 cm   FIBROIDS/POLYPS: not seen. .   OVARIES: right - 4.31 x 3.36 x 2.70 cm, left - 2.96 x 2.26 x 1.36 cm.   OVARIAN CYSTS/MASSES: seen-right ovarian simple cyst 3.11 x 1.81 x 2.46 cm.  COMPARATIVE DATA: available for examination  COMMENTS: uterus is anteverted, normal uterus with no obvious endometrial mass seen, right simple ovarian cyst noted, left ovary appears normal, no free fluid. Left ovarian cyst                           with septations that was on previous ultrasound is  not seen on today's exam.   Geetha Choi CNM  1/29/2024  10:19 EST

## 2024-03-19 ENCOUNTER — TELEPHONE (OUTPATIENT)
Dept: OBSTETRICS AND GYNECOLOGY | Facility: CLINIC | Age: 39
End: 2024-03-19
Payer: COMMERCIAL

## 2024-03-19 NOTE — TELEPHONE ENCOUNTER
Please call patient and see if she is interested in MRI for hyperprolactinemia as was recommended at last visit. If so I will place a new order. ALSO- pt cancelled follow ups. Was recommended endometrial sampling so please reschedule to discuss. Thanks!

## 2025-02-11 ENCOUNTER — OFFICE VISIT (OUTPATIENT)
Dept: FAMILY MEDICINE CLINIC | Facility: CLINIC | Age: 40
End: 2025-02-11
Payer: COMMERCIAL

## 2025-02-11 VITALS
BODY MASS INDEX: 36.82 KG/M2 | HEART RATE: 103 BPM | WEIGHT: 207.8 LBS | OXYGEN SATURATION: 100 % | SYSTOLIC BLOOD PRESSURE: 118 MMHG | HEIGHT: 63 IN | TEMPERATURE: 97.8 F | DIASTOLIC BLOOD PRESSURE: 78 MMHG

## 2025-02-11 DIAGNOSIS — R14.0 ABDOMINAL BLOATING: ICD-10-CM

## 2025-02-11 DIAGNOSIS — E03.9 ACQUIRED HYPOTHYROIDISM: Primary | ICD-10-CM

## 2025-02-11 DIAGNOSIS — R10.13 DYSPEPSIA: ICD-10-CM

## 2025-02-11 DIAGNOSIS — R30.0 DYSURIA: ICD-10-CM

## 2025-02-11 LAB
BILIRUB BLD-MCNC: NEGATIVE MG/DL
CLARITY, POC: CLEAR
COLOR UR: YELLOW
EXPIRATION DATE: NORMAL
GLUCOSE UR STRIP-MCNC: NEGATIVE MG/DL
KETONES UR QL: NEGATIVE
LEUKOCYTE EST, POC: NEGATIVE
Lab: NORMAL
NITRITE UR-MCNC: NEGATIVE MG/ML
PH UR: 6 [PH] (ref 5–8)
PROT UR STRIP-MCNC: NEGATIVE MG/DL
RBC # UR STRIP: NEGATIVE /UL
SP GR UR: 1.02 (ref 1–1.03)
UROBILINOGEN UR QL: NORMAL

## 2025-02-11 PROCEDURE — 81003 URINALYSIS AUTO W/O SCOPE: CPT | Performed by: FAMILY MEDICINE

## 2025-02-11 PROCEDURE — 99214 OFFICE O/P EST MOD 30 MIN: CPT | Performed by: FAMILY MEDICINE

## 2025-02-11 NOTE — PROGRESS NOTES
"Chief Complaint  Hypothyroidism    Subjective        Coco Sánchez presents to [facility]  History of Present Illness    History of Present Illness  The patient presents for a thyroid follow-up, weight management, dyspepsia, and dysuria.    She was previously on a regimen of 25 mcg of levothyroxine, which was increased to 50 mcg following her last laboratory evaluation in June 2024. . She reports a sensation of food remaining in her esophagus after eating, even with small amounts such as an orange. She experiences frequent belching but does not report any symptoms of acid reflux. She is not currently on any medication for gastroesophageal reflux disease (GERD). A CT scan performed in [facility] did not reveal any gastrointestinal abnormalities. She suspects that her food may be digesting slowly.     She is adhering to an intermittent fasting regimen and engaging in physical activities such as using the [employer] machine and practicing yoga. Despite these efforts, she has not observed any significant changes in her weight.    She also reports a burning sensation during urination.    MEDICATIONS  Levothyroxine     Objective   Vital Signs:  /78 (BP Location: Left arm, Patient Position: Sitting, Cuff Size: Adult)   Pulse 103   Temp 97.8 °F (36.6 °C) (Temporal)   Ht 160 cm (62.99\")   Wt 94.3 kg (207 lb 12.8 oz)   SpO2 100%   BMI 36.82 kg/m²   Estimated body mass index is 36.82 kg/m² as calculated from the following:    Height as of this encounter: 160 cm (62.99\").    Weight as of this encounter: 94.3 kg (207 lb 12.8 oz).             Physical Exam   Result Review :                   Assessment and Plan   Diagnoses and all orders for this visit:    1. Acquired hypothyroidism (Primary)  -     TSH Rfx On Abnormal To Free T4  -     Thyroid Peroxidase Antibody  -     H. Pylori Breath Test - Breath, Lung    2. Dyspepsia  -     H. Pylori Breath Test - Breath, Lung  -     Ambulatory " Referral to Gastroenterology    3. Abdominal bloating  -     Ambulatory Referral to Gastroenterology    4. Dysuria  -     POC Urinalysis Dipstick, Automated        Assessment & Plan  1. Hypothyroidism.  Her TSH levels were not within the normal range during the last evaluation. She has been on a regimen of 50 mcg of levothyroxine, with only three doses remaining. A re-evaluation of her thyroid function will be conducted today. A prescription for levothyroxine 50 mcg will be sent to her pharmacy.    2. Dyspepsia.  She reports a sensation of food being stuck and frequent burping but does not experience acid reflux or indigestion. A referral to a gastroenterologist will be made. Additionally, a breath test for H. pylori will be conducted.    3. Dysuria.  She reports a burning sensation when passing urine.  Urine  dip done in the office negative for nitrite leukocytes or blood.    Follow-up as needed she is due for physical but does not want to get the physical done here as she gets her physical done in [place]       Follow Up   There are no Patient Instructions on file for this visit.   No follow-ups on file.  Patient was given instructions and counseling regarding her condition or for health maintenance advice. Please see specific information pulled into the AVS if appropriate.     Patient or patient representative verbalized consent for the use of Ambient Listening during the visit with  Opal Sparks MD for chart documentation. 2/11/2025  16:51 EST

## 2025-02-13 LAB
THYROPEROXIDASE AB SERPL-ACNC: 14 IU/ML (ref 0–34)
TSH SERPL DL<=0.005 MIU/L-ACNC: 2.88 UIU/ML (ref 0.45–4.5)
UREA BREATH TEST QL: POSITIVE

## 2025-02-17 ENCOUNTER — PATIENT MESSAGE (OUTPATIENT)
Dept: FAMILY MEDICINE CLINIC | Facility: CLINIC | Age: 40
End: 2025-02-17
Payer: COMMERCIAL

## 2025-02-18 RX ORDER — LEVOTHYROXINE SODIUM 25 UG/1
25 TABLET ORAL DAILY
Qty: 90 TABLET | Refills: 0 | Status: SHIPPED | OUTPATIENT
Start: 2025-02-18

## 2025-02-18 NOTE — TELEPHONE ENCOUNTER
Rx Refill Note  Requested Prescriptions     Pending Prescriptions Disp Refills    levothyroxine (Synthroid) 25 MCG tablet 90 tablet 0     Sig: Take 1 tablet by mouth Daily.      Last office visit with prescribing clinician: 2-11-25  Last telemedicine visit with prescribing clinician: Visit date not found   Next office visit with prescribing clinician: Visit date not found                         Would you like a call back once the refill request has been completed: [] Yes [] No    If the office needs to give you a call back, can they leave a voicemail: [] Yes [] No    Beverly Campbell LPN  02/18/25, 06:34 EST

## 2025-02-21 ENCOUNTER — TELEPHONE (OUTPATIENT)
Dept: FAMILY MEDICINE CLINIC | Facility: CLINIC | Age: 40
End: 2025-02-21

## 2025-02-21 NOTE — TELEPHONE ENCOUNTER
"  Caller: Coco Sánchez \"Coco\"    Relationship: Self    Best call back number: [phone]     Who are you requesting to speak with (clinical staff, provider,  specific staff member): CLINICAL STAFF      What was the call regarding: PATIENT WANTS TO KNOW IF SHE NEEDS TO SEE HERE PCP BEFORE TAKING THE GASTRO APPOINTMENT THAT WAS REFERRED.  PLEASE CALL TO ADVISE  "

## 2025-02-28 ENCOUNTER — TELEPHONE (OUTPATIENT)
Dept: GASTROENTEROLOGY | Facility: CLINIC | Age: 40
End: 2025-02-28
Payer: COMMERCIAL

## 2025-02-28 NOTE — TELEPHONE ENCOUNTER
Ok for the hub to relay,  Called the pt to see if she can come in on Monday 3/3 to see Brinda Trinh as a new pt at 8:30.  She was scheduled to see Brinda on 5/5.

## 2025-03-03 ENCOUNTER — OFFICE VISIT (OUTPATIENT)
Dept: GASTROENTEROLOGY | Facility: CLINIC | Age: 40
End: 2025-03-03
Payer: COMMERCIAL

## 2025-03-03 VITALS
TEMPERATURE: 98.1 F | HEIGHT: 63 IN | HEART RATE: 90 BPM | WEIGHT: 205.2 LBS | DIASTOLIC BLOOD PRESSURE: 84 MMHG | BODY MASS INDEX: 36.36 KG/M2 | SYSTOLIC BLOOD PRESSURE: 124 MMHG

## 2025-03-03 DIAGNOSIS — A04.8 H. PYLORI INFECTION: Primary | ICD-10-CM

## 2025-03-03 DIAGNOSIS — R14.2 BELCHING: ICD-10-CM

## 2025-03-03 DIAGNOSIS — R14.0 BLOATING SYMPTOM: ICD-10-CM

## 2025-03-03 DIAGNOSIS — R10.10 PAIN OF UPPER ABDOMEN: ICD-10-CM

## 2025-03-03 LAB
ALBUMIN SERPL-MCNC: 4.2 G/DL (ref 3.5–5.2)
ALBUMIN/GLOB SERPL: 1.5 G/DL
ALP SERPL-CCNC: 81 U/L (ref 39–117)
ALT SERPL-CCNC: 21 U/L (ref 1–33)
AST SERPL-CCNC: 23 U/L (ref 1–32)
BILIRUB SERPL-MCNC: 0.2 MG/DL (ref 0–1.2)
BUN SERPL-MCNC: 11 MG/DL (ref 6–20)
BUN/CREAT SERPL: 14.9 (ref 7–25)
CALCIUM SERPL-MCNC: 9.9 MG/DL (ref 8.6–10.5)
CHLORIDE SERPL-SCNC: 101 MMOL/L (ref 98–107)
CO2 SERPL-SCNC: 23.6 MMOL/L (ref 22–29)
CREAT SERPL-MCNC: 0.74 MG/DL (ref 0.57–1)
EGFRCR SERPLBLD CKD-EPI 2021: 107 ML/MIN/1.73
ERYTHROCYTE [DISTWIDTH] IN BLOOD BY AUTOMATED COUNT: 15.9 % (ref 12.3–15.4)
GLOBULIN SER CALC-MCNC: 2.8 GM/DL
GLUCOSE SERPL-MCNC: 85 MG/DL (ref 65–99)
HCT VFR BLD AUTO: 39.8 % (ref 34–46.6)
HGB BLD-MCNC: 12 G/DL (ref 12–15.9)
MCH RBC QN AUTO: 21.9 PG (ref 26.6–33)
MCHC RBC AUTO-ENTMCNC: 30.2 G/DL (ref 31.5–35.7)
MCV RBC AUTO: 72.5 FL (ref 79–97)
PLATELET # BLD AUTO: 449 10*3/MM3 (ref 140–450)
POTASSIUM SERPL-SCNC: 4.5 MMOL/L (ref 3.5–5.2)
PROT SERPL-MCNC: 7 G/DL (ref 6–8.5)
RBC # BLD AUTO: 5.49 10*6/MM3 (ref 3.77–5.28)
SODIUM SERPL-SCNC: 138 MMOL/L (ref 136–145)
WBC # BLD AUTO: 8.57 10*3/MM3 (ref 3.4–10.8)

## 2025-03-03 RX ORDER — OMEPRAZOLE 20 MG/1
20 CAPSULE, DELAYED RELEASE ORAL 2 TIMES DAILY
Qty: 20 CAPSULE | Refills: 0 | Status: SHIPPED | OUTPATIENT
Start: 2025-03-03 | End: 2025-03-10

## 2025-03-03 RX ORDER — BISMUTH SUBCITRATE POTASSIUM, METRONIDAZOLE AND TETRACYCLINE HYDROCHLORIDE 140; 125; 125 MG/1; MG/1; MG/1
3 CAPSULE ORAL
Qty: 168 CAPSULE | Refills: 0 | Status: SHIPPED | OUTPATIENT
Start: 2025-03-03 | End: 2025-03-13

## 2025-03-03 NOTE — PROGRESS NOTES
Chief Complaint   Patient presents with    Abdominal Pain       HPI    Coco Sánchez is a  39 y.o. female here with a history of thyroid disease to establish care as a new patient for complaints of upper abdominal pain, bloating, belching and early satiety.    This patient will also follow with Dr. Graf.    Patient reports roughly 2 years of symptoms.  Symptoms come and go throughout the week.  Reports upper abdominal discomfort worse after eating.  Associated with belching, bloating and early satiety.  Appetite is good.  Her weight is stable in fact she has struggled to lose weight.  She eats 2 meals a day on average.    No diarrhea, constipation, rectal bleeding or rectal pain.  No family history of GI malignancy.    Recent breath test positive for H. pylori.  Patient has not been treated.    Past Medical History:   Diagnosis Date    Hypothyroid        Past Surgical History:   Procedure Laterality Date     SECTION         Scheduled Meds:     Continuous Infusions: No current facility-administered medications for this visit.      PRN Meds:     No Known Allergies    Social History     Socioeconomic History    Marital status:    Tobacco Use    Smoking status: Never    Smokeless tobacco: Never   Vaping Use    Vaping status: Never Used   Substance and Sexual Activity    Alcohol use: Never    Drug use: Never    Sexual activity: Yes     Partners: Male     Birth control/protection: None       Family History   Problem Relation Age of Onset    Diabetes Father     Breast cancer Neg Hx     Ovarian cancer Neg Hx     Uterine cancer Neg Hx     Colon cancer Neg Hx        Review of Systems   Gastrointestinal:  Positive for abdominal pain.       Vitals:    25 0859   BP: 124/84   Pulse: 90   Temp: 98.1 °F (36.7 °C)       Physical Exam  Constitutional:       Appearance: She is well-developed.   Abdominal:      General: Bowel sounds are normal. There is no distension.      Palpations: Abdomen is soft. There  is no mass.      Tenderness: There is no abdominal tenderness. There is no guarding.      Hernia: No hernia is present.   Skin:     General: Skin is warm and dry.      Capillary Refill: Capillary refill takes less than 2 seconds.   Neurological:      Mental Status: She is alert and oriented to person, place, and time.   Psychiatric:         Behavior: Behavior normal.     Assessment    Diagnoses and all orders for this visit:    1. H. pylori infection (Primary)    2. Pain of upper abdomen    3. Belching    4. Bloating symptom    Plan    Recommend treatment for H. pylori infection with Pylera and monitor for symptom improvement once therapy is done  She will need a follow-up H. pylori breath test roughly 6 weeks after completion of therapy  Check CBC and CMP today and for thoroughness sake check celiac comprehensive panel  If symptoms do not resolve with H. pylori treatment consider EGD  Follow-up and further recommendations pending response to H. pylori treatment and serology         REEMA Potter  [facility], [place], [facility]  [address] [place] [address]  Office: [phone]

## 2025-03-04 LAB
ENDOMYSIUM IGA SER QL: POSITIVE
GLIADIN PEPTIDE IGA SER-ACNC: 5 UNITS (ref 0–19)
GLIADIN PEPTIDE IGG SER-ACNC: 1 UNITS (ref 0–19)
IGA SERPL-MCNC: 147 MG/DL (ref 87–352)
TTG IGA SER-ACNC: <2 U/ML (ref 0–3)
TTG IGG SER-ACNC: 3 U/ML (ref 0–5)

## 2025-03-05 NOTE — PROGRESS NOTES
Please inform the patient lab work shows no evidence of celiac disease.  Normal hemoglobin.  Normal liver function.  Confirm she was able to  her antibiotics for H. pylori.  She needs a repeat breath test 6 weeks after completion of antibiotic regimen.  Make sure she gets this lab appointment scheduled.

## 2025-03-06 ENCOUNTER — TELEPHONE (OUTPATIENT)
Dept: GASTROENTEROLOGY | Facility: CLINIC | Age: 40
End: 2025-03-06
Payer: COMMERCIAL

## 2025-03-06 DIAGNOSIS — A04.8 H. PYLORI INFECTION: Primary | ICD-10-CM

## 2025-03-06 NOTE — TELEPHONE ENCOUNTER
----- Message from Brinda Trinh sent at 3/5/2025  9:19 AM EST -----  Please inform the patient lab work shows no evidence of celiac disease.  Normal hemoglobin.  Normal liver function.  Confirm she was able to  her antibiotics for H. pylori.  She needs a repeat breath test 6 weeks after completion of antibiotic   regimen.  Make sure she gets this lab appointment scheduled.

## 2025-03-06 NOTE — TELEPHONE ENCOUNTER
Hub staff attempted to follow warm transfer process and was unsuccessful     Caller: AGUILA CONNOR     Relationship to patient: SELF     Best call back number: [phone]     Patient is needing: PT IS CALLING ARON BACK AND SHE STATED YOU CAN CALL HER  HE IS ON THE VERBAL RELEASE WITH ALL CHECK MARKS MARKED PLEASE ADVISE AND CALL PT BACK

## 2025-03-06 NOTE — TELEPHONE ENCOUNTER
Called pt and advised of Brinda PISANO's note.  Pt verbalized understanding.    Pt picked up medication.  Made pt lab appt for 4/2/25 @2pm.  Pt could not make an appt for 6 weeks from now d/t going out of [place] country the 2nd week of April and will not be back for 6 months.       Order placed for breath test, sent to NORRIS Parry to cosign.     Update sent to NORRIS Parry.

## 2025-03-10 RX ORDER — OMEPRAZOLE 20 MG/1
20 CAPSULE, DELAYED RELEASE ORAL 2 TIMES DAILY
Qty: 20 CAPSULE | Refills: 0 | Status: SHIPPED | OUTPATIENT
Start: 2025-03-10

## 2025-03-27 ENCOUNTER — TELEPHONE (OUTPATIENT)
Dept: GASTROENTEROLOGY | Facility: CLINIC | Age: 40
End: 2025-03-27
Payer: COMMERCIAL

## 2025-03-27 NOTE — TELEPHONE ENCOUNTER
Left jose to schedule lab and follow up in office w/tamar or darion 10 days later     Ok for the hub to relay

## 2025-03-27 NOTE — TELEPHONE ENCOUNTER
Called the pt to schedule her for labs and an appt for 10 days after.  (See note).  Pt is going out of the country on 4/14 and needs to have these before then.  There are no appts available with Brinda or tamar before then. Do you want to work her in?

## 2025-03-27 NOTE — TELEPHONE ENCOUNTER
"Name: Coco Sánchez \"Coco\"      Relationship: Self      Best Callback Number: [phone]      HUB PROVIDED THE RELAY MESSAGE FROM THE OFFICE      PATIENT: VOICED UNDERSTANDING AND HAS NO FURTHER QUESTIONS AT THIS TIME    ADDITIONAL INFORMATION: PATIENT IS RETURNING A CALL FROM ROB TO SCHEDULE LABS AND FOLLOW UP.  PLEASE CALL BACK.   "

## 2025-04-02 ENCOUNTER — TELEPHONE (OUTPATIENT)
Dept: GASTROENTEROLOGY | Facility: CLINIC | Age: 40
End: 2025-04-02
Payer: COMMERCIAL

## 2025-04-09 ENCOUNTER — LAB (OUTPATIENT)
Dept: GASTROENTEROLOGY | Facility: CLINIC | Age: 40
End: 2025-04-09
Payer: COMMERCIAL

## 2025-04-16 ENCOUNTER — RESULTS FOLLOW-UP (OUTPATIENT)
Dept: GASTROENTEROLOGY | Facility: CLINIC | Age: 40
End: 2025-04-16
Payer: COMMERCIAL

## 2025-04-16 NOTE — PROGRESS NOTES
Please inform the patient of breath test is positive for H. pylori.  Was she able to take all of the antibiotics that were prescribed?  Did she miss any doses?

## 2025-04-23 NOTE — PROGRESS NOTES
Thanks for the clarification.  Hopefully it was efficacious.  Have her come back in for an H. pylori breath test in 6 weeks.  Review protocol prior to breath testing with the patient.  Thanks bradycardia

## 2025-04-29 NOTE — TELEPHONE ENCOUNTER
Message from Brinda:     Please check with the patient and ascertain why she did not take antibiotics as prescribed?

## 2025-05-27 RX ORDER — LEVOTHYROXINE SODIUM 25 UG/1
25 TABLET ORAL DAILY
Qty: 90 TABLET | Refills: 1 | Status: SHIPPED | OUTPATIENT
Start: 2025-05-27